# Patient Record
Sex: MALE | Race: WHITE | NOT HISPANIC OR LATINO | Employment: FULL TIME | URBAN - METROPOLITAN AREA
[De-identification: names, ages, dates, MRNs, and addresses within clinical notes are randomized per-mention and may not be internally consistent; named-entity substitution may affect disease eponyms.]

---

## 2019-04-27 ENCOUNTER — TRANSCRIBE ORDERS (OUTPATIENT)
Dept: ADMINISTRATIVE | Facility: HOSPITAL | Age: 29
End: 2019-04-27

## 2019-04-27 DIAGNOSIS — N50.819 TESTICULAR PAIN: Primary | ICD-10-CM

## 2019-05-06 ENCOUNTER — HOSPITAL ENCOUNTER (OUTPATIENT)
Dept: ULTRASOUND IMAGING | Facility: HOSPITAL | Age: 29
Discharge: HOME | End: 2019-05-06
Payer: COMMERCIAL

## 2019-05-06 DIAGNOSIS — N50.819 TESTICULAR PAIN: ICD-10-CM

## 2019-05-06 PROCEDURE — 76870 US EXAM SCROTUM: CPT

## 2022-02-16 ENCOUNTER — HOSPITAL ENCOUNTER (EMERGENCY)
Facility: HOSPITAL | Age: 32
Discharge: HOME | End: 2022-02-16
Attending: EMERGENCY MEDICINE
Payer: COMMERCIAL

## 2022-02-16 VITALS
WEIGHT: 173.72 LBS | OXYGEN SATURATION: 99 % | DIASTOLIC BLOOD PRESSURE: 85 MMHG | SYSTOLIC BLOOD PRESSURE: 136 MMHG | RESPIRATION RATE: 16 BRPM | HEART RATE: 83 BPM | TEMPERATURE: 98.6 F

## 2022-02-16 DIAGNOSIS — Z77.29 CARBON MONOXIDE EXPOSURE: Primary | ICD-10-CM

## 2022-02-16 LAB — GAS + CO PNL BLDA: 2.2 % (ref 0–1.5)

## 2022-02-16 PROCEDURE — 99282 EMERGENCY DEPT VISIT SF MDM: CPT | Performed by: EMERGENCY MEDICINE

## 2022-02-16 PROCEDURE — 36415 COLL VENOUS BLD VENIPUNCTURE: CPT

## 2022-02-16 PROCEDURE — 82375 ASSAY CARBOXYHB QUANT: CPT

## 2022-02-16 PROCEDURE — 99283 EMERGENCY DEPT VISIT LOW MDM: CPT

## 2022-02-16 NOTE — ED ATTENDING ATTESTATION
2/16/2022  IBree MD, saw and evaluated the patient  I have discussed the patient with the resident/non-physician practitioner and agree with the resident's/non-physician practitioner's findings, Plan of Care, and MDM as documented in the resident's/non-physician practitioner's note, except where noted  All available labs and Radiology studies were reviewed  I was present for key portions of any procedure(s) performed by the resident/non-physician practitioner and I was immediately available to provide assistance  At this point I agree with the current assessment done in the Emergency Department  I have conducted an independent evaluation of this patient a history and physical is as follows:    ED Course         Critical Care Time  Procedures    Patient presents after having his boiler serviced, concern for carbon monoxide exposure  Low 60s PPM per their home CO detector  Asymptomatic  No f/c/s  No vomiting or diarrhea  MDM will check CO levels
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
## 2022-02-16 NOTE — DISCHARGE INSTRUCTIONS
Return to the emergency department immediately if you develop nausea, vomiting, severe headache, confusion, lethargy, loss of consciousness

## 2025-03-11 ENCOUNTER — HOSPITAL ENCOUNTER (EMERGENCY)
Facility: HOSPITAL | Age: 35
Discharge: HOME | End: 2025-03-11
Attending: EMERGENCY MEDICINE
Payer: COMMERCIAL

## 2025-03-11 VITALS
OXYGEN SATURATION: 99 % | DIASTOLIC BLOOD PRESSURE: 98 MMHG | SYSTOLIC BLOOD PRESSURE: 144 MMHG | TEMPERATURE: 97.8 F | HEART RATE: 84 BPM | RESPIRATION RATE: 18 BRPM

## 2025-03-11 DIAGNOSIS — M54.9 BACK PAIN: ICD-10-CM

## 2025-03-11 DIAGNOSIS — S29.012A STRAIN OF MID-BACK, INITIAL ENCOUNTER: ICD-10-CM

## 2025-03-11 DIAGNOSIS — M54.9 MID BACK PAIN ON LEFT SIDE: Primary | ICD-10-CM

## 2025-03-11 PROCEDURE — 99284 EMERGENCY DEPT VISIT MOD MDM: CPT | Performed by: EMERGENCY MEDICINE

## 2025-03-11 PROCEDURE — 96372 THER/PROPH/DIAG INJ SC/IM: CPT

## 2025-03-11 PROCEDURE — 99282 EMERGENCY DEPT VISIT SF MDM: CPT

## 2025-03-11 RX ORDER — KETOROLAC TROMETHAMINE 30 MG/ML
15 INJECTION, SOLUTION INTRAMUSCULAR; INTRAVENOUS ONCE
Status: COMPLETED | OUTPATIENT
Start: 2025-03-11 | End: 2025-03-11

## 2025-03-11 RX ORDER — LIDOCAINE 50 MG/G
1 PATCH TOPICAL ONCE
Status: DISCONTINUED | OUTPATIENT
Start: 2025-03-11 | End: 2025-03-11 | Stop reason: HOSPADM

## 2025-03-11 RX ORDER — ACETAMINOPHEN 325 MG/1
975 TABLET ORAL ONCE
Status: COMPLETED | OUTPATIENT
Start: 2025-03-11 | End: 2025-03-11

## 2025-03-11 RX ORDER — NAPROXEN 500 MG/1
500 TABLET ORAL 2 TIMES DAILY WITH MEALS
Qty: 30 TABLET | Refills: 0 | Status: SHIPPED | OUTPATIENT
Start: 2025-03-11

## 2025-03-11 RX ORDER — METHOCARBAMOL 500 MG/1
500 TABLET, FILM COATED ORAL 2 TIMES DAILY
Qty: 20 TABLET | Refills: 0 | Status: SHIPPED | OUTPATIENT
Start: 2025-03-11

## 2025-03-11 RX ORDER — METHOCARBAMOL 500 MG/1
1000 TABLET, FILM COATED ORAL ONCE
Status: COMPLETED | OUTPATIENT
Start: 2025-03-11 | End: 2025-03-11

## 2025-03-11 RX ADMIN — METHOCARBAMOL 1000 MG: 500 TABLET ORAL at 14:01

## 2025-03-11 RX ADMIN — ACETAMINOPHEN 975 MG: 325 TABLET, FILM COATED ORAL at 14:01

## 2025-03-11 RX ADMIN — KETOROLAC TROMETHAMINE 15 MG: 30 INJECTION, SOLUTION INTRAMUSCULAR; INTRAVENOUS at 14:03

## 2025-03-11 RX ADMIN — LIDOCAINE 1 PATCH: 700 PATCH TOPICAL at 14:01

## 2025-03-11 NOTE — ED ATTENDING ATTESTATION
3/11/2025  I, Diogenes Vera MD, saw and evaluated the patient. I have discussed the patient with the resident/non-physician practitioner and agree with the resident's/non-physician practitioner's findings, Plan of Care, and MDM as documented in the resident's/non-physician practitioner's note, except where noted. All available labs and Radiology studies were reviewed.  I was present for key portions of any procedure(s) performed by the resident/non-physician practitioner and I was immediately available to provide assistance.       At this point I agree with the current assessment done in the Emergency Department.  I have conducted an independent evaluation of this patient a history and physical is as follows:    S:  Chief Complaint   Patient presents with    Back Pain     Patient reports back spasms started Monday reports today has been getting worse through the day.      Chetan is a 34 y.o. male with a history of herniated discs who presents with the chief complaint of muscle spasms in his mid/lower right side of his back.  He reports that yesterday he was picking up sticks and thinks he overdid it.  He has been taking otc medications without significant improvement.  No saddle anesthesia, no loss of strength.    O:  ED Triage Vitals [03/11/25 1259]   Temperature Pulse Respirations Blood Pressure SpO2   97.8 °F (36.6 °C) 84 18 144/98 99 %      Temp Source Heart Rate Source Patient Position - Orthostatic VS BP Location FiO2 (%)   Oral Monitor -- Right arm --      Pain Score       4         Physical Exam  Vitals and nursing note reviewed.   Constitutional:       General: He is in acute distress.      Appearance: He is well-developed.   HENT:      Head: Normocephalic and atraumatic.   Eyes:      Pupils: Pupils are equal, round, and reactive to light.   Neck:      Vascular: No JVD.   Cardiovascular:      Rate and Rhythm: Normal rate and regular rhythm.      Heart sounds: Normal heart sounds. No murmur  heard.     No friction rub. No gallop.   Pulmonary:      Effort: Pulmonary effort is normal. No respiratory distress.      Breath sounds: Normal breath sounds. No wheezing or rales.   Chest:      Chest wall: No tenderness.   Musculoskeletal:         General: Tenderness (right sided muscular tenderness in back) present. Normal range of motion.      Cervical back: Normal range of motion.   Skin:     General: Skin is warm and dry.   Neurological:      General: No focal deficit present.      Mental Status: He is alert and oriented to person, place, and time.      Cranial Nerves: No cranial nerve deficit.      Motor: No weakness.   Psychiatric:         Behavior: Behavior normal.         Thought Content: Thought content normal.         Judgment: Judgment normal.         A/P:  34 y.o. male presents with chief complaint of right low back pain without radiation.  No concerning s/s for cauda equina or epidural abscess.  Will treat with analgesia and anti-inflammatory regimen and refer to spine if symptoms persist.       ED Course     Medications   methocarbamol (ROBAXIN) tablet 1,000 mg (1,000 mg Oral Given 3/11/25 1401)   acetaminophen (TYLENOL) tablet 975 mg (975 mg Oral Given 3/11/25 1401)   ketorolac (TORADOL) injection 15 mg (15 mg Intramuscular Given 3/11/25 1403)         Critical Care Time  Procedures    Time reflects when diagnosis was documented in both MDM as applicable and the Disposition within this note       Time User Action Codes Description Comment    3/11/2025  1:56 PM Enrique Mcelroy [M54.9] Mid back pain on left side     3/11/2025  2:24 PM Enrique Mcelroy Add [M54.9] Back pain     3/11/2025  2:47 PM Enrique Mcelroy [S29.012A] Strain of mid-back, initial encounter           ED Disposition       ED Disposition   Discharge    Condition   Stable    Date/Time   Tue Mar 11, 2025  2:22 PM    Comment   Chetan Dennis discharge to home/self care.                   Follow-up Information       Follow up With  Specialties Details Why Contact Info Additional Information    Uday Blackmon,  Family Medicine  Please call tomorrow to schedule an appointment [address] [address]  [phone]       [facility] Physical Therapy  Please call tomorrow to schedule an appointment [phone]

## 2025-03-11 NOTE — ED PROVIDER NOTES
"Time reflects when diagnosis was documented in both MDM as applicable and the Disposition within this note       Time User Action Codes Description Comment    3/11/2025  1:56 PM Enrique Mcelroy [M54.9] Mid back pain on left side     3/11/2025  2:24 PM Enrique Mcelroy Add [M54.9] Back pain     3/11/2025  2:47 PM Enrique Mcelroy [S29.012A] Strain of mid-back, initial encounter           ED Disposition       ED Disposition   Discharge    Condition   Stable    Date/Time   Tue Mar 11, 2025  2:22 PM    Comment   Chetan Dennis discharge to home/self care.                   Assessment & Plan       Medical Decision Making  Patient with history as below presented to triage with CC of \" Patient presents with:  Back Pain: Patient reports back spasms started Monday reports today has been getting worse through the day.    \"  Hx obtained from pt. Exam as below.    Differential diagnoses includes lumbago versus musculoskeletal spasm / strain. No back pain red flags on history or physical. Presentation not consistent with malignancy (lack of history of malignancy, lack of B symptoms), fracture (no trauma, no bony tenderness to palpation), cauda equina (no bowel or urinary incontinence/retention, no saddle anesthesia, no distal weakness), AAA, viscus perforation , pulmonary embolism, renal colic, pyelonephritis (afebrile, no CVAT, no urinary symptoms). Given the clinical picture, no indication for imaging at this time.    Plan: pain control, supportive care, reassess, outpatient referral to comprehensive spine    DDx including but not limited to: sciatica, herniated disc, arthritis, spinal stenosis, strain, sprain, fracture, cauda equina syndrome, epidural abscess, transverse myelitis, AAA.        I have independently ordered, reviewed and interpreted the following: labs and/or imaging studies and or EKG listed below.  Reviewed external records including notes, and prior labs/imaging results.    Disposition: Patient stable for " outpatient management. Discussed need for follow up with their primary doctor or specialist to review all results, including incidental findings as below. Patient discharged with explanation of ED workup and diagnosis, instructions on how to obtain outpatient follow up, care instructions at home, and strict return precautions if patient develops new or worsening symptoms. Patients questions answered and agreeable with discharge plan.     See ED Course for further MDM.      PLEASE NOTE:  This encounter was completed utilizing the [employer]- [employer]Fluency Direct Speech Voice Recognition Software. Grammatical errors, random word insertions, pronoun errors and incomplete sentences are occasional inherent consequences of the system due to software limitations, ambient noise and hardware issues.These may be missed by proof reading prior to affixing electronic signature. Any questions or concerns about the content, text or information contained within the body of this dictation should be directly addressed to the physician for clarification. Please do not hesitate to call me directly if you have any questions or concerns.     Risk  OTC drugs.  Prescription drug management.             Medications   lidocaine (LIDODERM) 5 % patch 1 patch (1 patch Topical Medication Applied 3/11/25 1401)   methocarbamol (ROBAXIN) tablet 1,000 mg (1,000 mg Oral Given 3/11/25 1401)   acetaminophen (TYLENOL) tablet 975 mg (975 mg Oral Given 3/11/25 1401)   ketorolac (TORADOL) injection 15 mg (15 mg Intramuscular Given 3/11/25 1403)       ED Risk Strat Scores                                                History of Present Illness       Chief Complaint   Patient presents with    Back Pain     Patient reports back spasms started Monday reports today has been getting worse through the day.        No past medical history on file.   No past surgical history on file.   No family history on file.   Social History     Tobacco Use    Smoking status: Light  Smoker     Types: Cigars    Smokeless tobacco: Never      E-Cigarette/Vaping      E-Cigarette/Vaping Substances      I have reviewed and agree with the history as documented.     34-year-old male with history of prior herniated lumbar disc presenting to the ED with complaints of acute right-sided mid thoracic back pain that started this morning.  Patient denies any specific injury or trauma, however does report bending up and down multiple times picking up sticks in his yard yesterday.  Reports that he has had constant muscle spasm and pain since this morning, worse with positional changes and coughing.  Denies associated fever, chills, saddle anesthesia, bowel/bladder incontinence, lower extremity/upper extremity weakness, history of IV drug use or history of malignancy.  Denies associated chest pain, shortness of breath, abdominal pain, nausea, vomiting, urinary symptoms, rash.        Review of Systems   Constitutional:  Negative for chills and fever.   HENT:  Negative for congestion and sore throat.    Eyes:  Negative for photophobia, pain, redness and visual disturbance.   Respiratory:  Negative for cough, chest tightness and shortness of breath.    Cardiovascular:  Negative for chest pain, palpitations and leg swelling.   Gastrointestinal:  Negative for abdominal pain, constipation, diarrhea, nausea and vomiting.   Genitourinary:  Negative for dysuria, flank pain, frequency, hematuria, testicular pain and urgency.   Musculoskeletal:  Positive for back pain. Negative for arthralgias, neck pain and neck stiffness.   Skin:  Negative for color change and rash.   Neurological:  Negative for dizziness, seizures, syncope, weakness, light-headedness, numbness and headaches.   All other systems reviewed and are negative.          Objective       ED Triage Vitals [03/11/25 1259]   Temperature Pulse Blood Pressure Respirations SpO2 Patient Position - Orthostatic VS   97.8 °F (36.6 °C) 84 144/98 18 99 % --      Temp Source  Heart Rate Source BP Location FiO2 (%) Pain Score    Oral Monitor Right arm -- 4      Vitals      Date and Time Temp Pulse SpO2 Resp BP Pain Score FACES Pain Rating User   03/11/25 1401 -- -- -- -- -- 3 -- AG   03/11/25 1259 97.8 °F (36.6 °C) 84 99 % 18 144/98 4 -- SB            Physical Exam  Vitals and nursing note reviewed.   Constitutional:       General: He is not in acute distress.     Appearance: He is well-developed. He is not toxic-appearing.   HENT:      Head: Normocephalic and atraumatic.      Right Ear: External ear normal.      Left Ear: External ear normal.      Nose: Nose normal. No congestion.      Mouth/Throat:      Mouth: Mucous membranes are moist.      Pharynx: Oropharynx is clear. No oropharyngeal exudate or posterior oropharyngeal erythema.   Eyes:      Extraocular Movements: Extraocular movements intact.      Conjunctiva/sclera: Conjunctivae normal.      Pupils: Pupils are equal, round, and reactive to light.   Cardiovascular:      Rate and Rhythm: Normal rate and regular rhythm.      Pulses: Normal pulses.      Heart sounds: Normal heart sounds. No murmur heard.     No friction rub. No gallop.   Pulmonary:      Effort: Pulmonary effort is normal. No respiratory distress.      Breath sounds: Normal breath sounds. No stridor. No wheezing, rhonchi or rales.   Abdominal:      General: Bowel sounds are normal.      Palpations: Abdomen is soft.      Tenderness: There is no abdominal tenderness. There is no right CVA tenderness, left CVA tenderness, guarding or rebound.   Musculoskeletal:         General: No swelling or deformity. Normal range of motion.      Cervical back: Normal, normal range of motion and neck supple. No rigidity or tenderness.      Thoracic back: Spasms and tenderness present. No bony tenderness.      Lumbar back: Normal. Negative right straight leg raise test and negative left straight leg raise test.        Back:       Right lower leg: No edema.      Left lower leg: No edema.    Lymphadenopathy:      Cervical: No cervical adenopathy.   Skin:     General: Skin is warm and dry.      Capillary Refill: Capillary refill takes less than 2 seconds.      Coloration: Skin is not jaundiced or pale.      Findings: No bruising, erythema, lesion or rash.   Neurological:      General: No focal deficit present.      Mental Status: He is alert and oriented to person, place, and time. Mental status is at baseline.      GCS: GCS eye subscore is 4. GCS verbal subscore is 5. GCS motor subscore is 6.      Cranial Nerves: Cranial nerves 2-12 are intact. No cranial nerve deficit, dysarthria or facial asymmetry.      Sensory: Sensation is intact. No sensory deficit.      Motor: Motor function is intact. No abnormal muscle tone or pronator drift.      Coordination: Coordination is intact.      Gait: Gait is intact.   Psychiatric:         Mood and Affect: Mood normal.         Behavior: Behavior normal.         Thought Content: Thought content normal.         Results Reviewed       None            No orders to display       Procedures    ED Medication and Procedure Management   None     Discharge Medication List as of 3/11/2025  2:25 PM        START taking these medications    Details   methocarbamol (ROBAXIN) 500 mg tablet Take 1 tablet (500 mg total) by mouth 2 (two) times a day, Starting Tue 3/11/2025, Normal      naproxen (Naprosyn) 500 mg tablet Take 1 tablet (500 mg total) by mouth 2 (two) times a day with meals, Starting Tue 3/11/2025, Normal             ED SEPSIS DOCUMENTATION   Time reflects when diagnosis was documented in both MDM as applicable and the Disposition within this note       Time User Action Codes Description Comment    3/11/2025  1:56 PM Enrique Mcelroy [M54.9] Mid back pain on left side     3/11/2025  2:24 PM Enrique Mcelroy [M54.9] Back pain     3/11/2025  2:47 PM Enrique Mcelroy [S29.012A] Strain of mid-back, initial encounter                  Enrique Mcelroy DO  03/11/25 0437

## 2025-03-11 NOTE — Clinical Note
Chetan Dennis was seen and treated in our emergency department on 3/11/2025.            No heavy lifting (>30lbs), light duty only.    Diagnosis:     Chetan  is off the rest of the shift today.    He may return on this date: 03/13/2025         If you have any questions or concerns, please don't hesitate to call.      Enrique Mcelroy, DO    ______________________________           _______________          _______________  Hospital Representative                              Date                                Time

## 2025-03-11 NOTE — DISCHARGE INSTRUCTIONS
Today Chetan Dennis was seen in the emergency department for back pain. I believe the symptoms to be the result of muscle spasm. At this time there does not appear to be an emergent life threatening cause to explain these symptoms. Chetan is stable for discharge with outpatient follow up.     Please follow up with your primary care provider in the week. A referral was placed for you to comprehensive spine please call them tomorrow to be seen at the next available appointment. Please review all results discussed today with your primary care provider and/or specialist.    Please return to the emergency department as soon as possible if you develop uncontrollable fevers (Temp >100.4F), uncontrollable pain, vomiting, chest pain, trouble breathing, weakness on one side of your body, slurred speech, vision changes or any other concerning symptoms.

## 2025-03-12 ENCOUNTER — TELEPHONE (OUTPATIENT)
Dept: PHYSICAL THERAPY | Facility: OTHER | Age: 35
End: 2025-03-12

## 2025-03-12 ENCOUNTER — NURSE TRIAGE (OUTPATIENT)
Dept: PHYSICAL THERAPY | Facility: OTHER | Age: 35
End: 2025-03-12

## 2025-03-12 DIAGNOSIS — M54.6 ACUTE BILATERAL THORACIC BACK PAIN: Primary | ICD-10-CM

## 2025-03-12 NOTE — TELEPHONE ENCOUNTER
Additional Information   Negative: Has the patient had unexplained weight loss?   Negative: Does the patient have a fever?   Negative: Is the patient experiencing urine retention?   Negative: Is the patient experiencing acute drop foot or paralysis?   Negative: Has the patient experienced major trauma? (fall from height, high speed collision, direct blow to spine) and is also experiencing nausea, light-headedness, or loss of consciousness?   Negative: Is the patient experiencing blood in sputum?   Negative: Is this a chronic condition?    Protocols used: [facility] Protocol    [facility] was reviewed in detail and what we can provide for their back pain.  Patient is agreeable to being triaged and would like to proceed with Physical Therapy.    Referral was placed for Physical Therapy at the [place] site. Patients information was sent to the  to make evaluation appointment. Patient made aware that the PT office  will be calling to schedule the appointment.  Patient was provided with the phone number to the PT office.    No further questions and/or concerns were voiced by the patient at this time. Patient states understanding of the referral that was placed.    Referral Closed.

## 2025-03-12 NOTE — TELEPHONE ENCOUNTER
Additional Information   Negative: Is this related to a work injury?   Negative: Is this related to an MVA?   Negative: Are you currently recieving homecare services?    Background - Initial Assessment  Clinical complaint: Pain is left mid back, during a spasm will be bilat mid back. No numbness or tingling. Started 3/10/25. Worsened on 3/11/25. NKI. Pt was doing yard work prior, but had no injury or immediate pain. Has a HX of low back pain. Was in PT for that in 2021. This pain is in a different area. Did have similar pain/spasms in the same mid back area approx 1 year ago. Was seen by either PCP or UC and given muscle relaxer's which cleared everything up in a day or so. He states his left mid back pain is steady and the bilat mid back pain is intermittent during spasms. No prior back SX. Pain is a constant dull ache. During a spasm is sharp, stabbing and squeezing. Spasms are present once every half hour. Seen in ED 3/11/25  Date of onset: 3/10/25  Frequency of pain: persistent  Quality of pain: aching, dull, sharp, and stabbing    Protocols used: [facility] Protocol

## 2025-03-12 NOTE — TELEPHONE ENCOUNTER
Call placed to the patient per [facility] referral.    Voice message left for patient to call back. Phone number and hours of business provided.       This is the 1st attempt to reach the patient.  Will defer per protocol.

## 2025-03-18 ENCOUNTER — EVALUATION (OUTPATIENT)
Dept: PHYSICAL THERAPY | Facility: REHABILITATION | Age: 35
End: 2025-03-18
Payer: COMMERCIAL

## 2025-03-18 DIAGNOSIS — M54.6 ACUTE BILATERAL THORACIC BACK PAIN: ICD-10-CM

## 2025-03-18 PROCEDURE — 97110 THERAPEUTIC EXERCISES: CPT | Performed by: PHYSICAL THERAPIST

## 2025-03-18 PROCEDURE — 97162 PT EVAL MOD COMPLEX 30 MIN: CPT | Performed by: PHYSICAL THERAPIST

## 2025-03-18 NOTE — LETTER
2025    Uday Blackmon DO  [address] [place] [address]    Patient: Chetan Dennis   YOB: 1990   Date of Visit: 3/18/2025     Encounter Diagnosis     ICD-10-CM    1. Acute bilateral thoracic back pain  M54.6 Ambulatory referral to PT spine          Dear Dr. Blackmon:    Thank you for your recent referral of Chetan Dennis. Please review the attached evaluation summary from Chetan's recent visit.     Please verify that you agree with the plan of care by signing the attached order.     If you have any questions or concerns, please do not hesitate to call.     I sincerely appreciate the opportunity to share in the care of one of your patients and hope to have another opportunity to work with you in the near future.       Sincerely,    Diogenes Lancaster, PT      Referring Provider:      I certify that I have read the below Plan of Care and certify the need for these services furnished under this plan of treatment while under my care.                    Uday Blackmon DO  [address] [place] [address]  Via Fax: [phone]          PT Evaluation     Today's date: 3/18/2025  Patient name: Chetan Dennis  : 1990  MRN: [id number]  Referring provider: Usama Walker PT  Dx:   Encounter Diagnosis     ICD-10-CM    1. Acute bilateral thoracic back pain  M54.6 Ambulatory referral to PT spine                     Assessment  Impairments: abnormal coordination, abnormal gait, abnormal muscle firing, abnormal or restricted ROM, abnormal movement, activity intolerance, impaired balance, impaired physical strength, lacks appropriate home exercise program, pain with function and weight-bearing intolerance    Assessment details: Patient presents to PT with thoracic pain. Patient displays decreased thoracic ROM, scapular strength. These impairments are leading to pain with coughing, breathing, UE use. Patient will benefit from skilled PT to address above impairments and help them  return to PLOF.    Barriers to therapy: Works in construction  Understanding of Dx/Px/POC: good     Prognosis: good    Goals  STG  1. Patient will display decreased pain to 0-2 in 6 weeks  2. Patient will display thoracic ROM WNL in 6 weeks  3. Patient will display scapular strength WNL in 6 weeks    LTG  1. Patient will be able to stand for 30 minutes without pain in 12 weeks  2. Patient will be able to walk for 30 minutes without pain in 12 weeks  3. Patient will be pick an object up off the floor without pain in 12 weeks          Plan  Planned modality interventions: traction, TENS, biofeedback, cryotherapy and thermotherapy: hydrocollator packs    Planned therapy interventions: abdominal trunk stabilization, activity modification, ADL training, balance, balance/weight bearing training, body mechanics training, joint mobilization, manual therapy, massage, motor coordination training, neuromuscular re-education, patient education, postural training, self care, strengthening, stretching, therapeutic activities, therapeutic exercise, transfer training, home exercise program, graded activity, graded exercise, functional ROM exercises and flexibility    Frequency: 2x week  Duration in weeks: 6  Plan of Care beginning date: 3/18/2025  Plan of Care expiration date: 2025        Subjective Evaluation    History of Present Illness  Mechanism of injury: Patient states about a week ago, he started getting muscle spasms in his mid back and couldn't move. He was even having trouble breathing. Patient is now doing better. He still feels tightness but the pain has mostly subsided. He is still being cautious. He is still having difficulty bending back. He denies symptoms into his UEs          Not a recurrent problem   Quality of life: good    Patient Goals  Patient goals for therapy: decreased edema, decreased pain, increased motion and increased strength    Pain  Current pain ratin  At best pain ratin  At worst pain  ratin  Quality: sharp and tight  Relieving factors: medications and heat  Aggravating factors: overhead activity and lifting  Progression: improved    Social Support  Steps to enter house: yes    Employment status: working        Objective     Active Range of Motion   Cervical/Thoracic Spine       Thoracic    Flexion:  WFL  Extension: 10 degrees     with pain Restriction level: moderate  Left lateral flexion: 20 degrees    with pain Restriction level: minimal  Right lateral flexion: 20 degrees  with pain Restriction level: minimal  Left rotation: 30 degrees  Restriction level: minimal  Right rotation: 30 degrees  with pain Restriction level: minimal    Strength/Myotome Testing     Left Shoulder     Isolated Muscles   Middle deltoid: 4   Middle trapezius: 4     Right Shoulder     Isolated Muscles   Middle deltoid: 4   Middle trapezius: 4              Precautions:       Manuals 3/18                                                                Neuro Re-Ed             rows Grn 3x10            Low row Grn 3x10            Pallof press                                                                 Ther Ex             ltr 2x10            Open book 2x10            T-spine ext             Foam roller             Prone t,i                                                    Ther Activity                                       Gait Training                                       Modalities

## 2025-03-18 NOTE — PROGRESS NOTES
PT Evaluation     Today's date: 3/18/2025  Patient name: Chetan Dennis  : 1990  MRN: [id number]  Referring provider: Usama Walker PT  Dx:   Encounter Diagnosis     ICD-10-CM    1. Acute bilateral thoracic back pain  M54.6 Ambulatory referral to PT spine                     Assessment  Impairments: abnormal coordination, abnormal gait, abnormal muscle firing, abnormal or restricted ROM, abnormal movement, activity intolerance, impaired balance, impaired physical strength, lacks appropriate home exercise program, pain with function and weight-bearing intolerance    Assessment details: Patient presents to PT with thoracic pain. Patient displays decreased thoracic ROM, scapular strength. These impairments are leading to pain with coughing, breathing, UE use. Patient will benefit from skilled PT to address above impairments and help them return to PLOF.    Barriers to therapy: Works in construction  Understanding of Dx/Px/POC: good     Prognosis: good    Goals  STG  1. Patient will display decreased pain to 0-2 in 6 weeks  2. Patient will display thoracic ROM WNL in 6 weeks  3. Patient will display scapular strength WNL in 6 weeks    LTG  1. Patient will be able to stand for 30 minutes without pain in 12 weeks  2. Patient will be able to walk for 30 minutes without pain in 12 weeks  3. Patient will be pick an object up off the floor without pain in 12 weeks          Plan  Planned modality interventions: traction, TENS, biofeedback, cryotherapy and thermotherapy: hydrocollator packs    Planned therapy interventions: abdominal trunk stabilization, activity modification, ADL training, balance, balance/weight bearing training, body mechanics training, joint mobilization, manual therapy, massage, motor coordination training, neuromuscular re-education, patient education, postural training, self care, strengthening, stretching, therapeutic activities, therapeutic exercise, transfer training, home exercise  program, graded activity, graded exercise, functional ROM exercises and flexibility    Frequency: 2x week  Duration in weeks: 6  Plan of Care beginning date: 3/18/2025  Plan of Care expiration date: 2025        Subjective Evaluation    History of Present Illness  Mechanism of injury: Patient states about a week ago, he started getting muscle spasms in his mid back and couldn't move. He was even having trouble breathing. Patient is now doing better. He still feels tightness but the pain has mostly subsided. He is still being cautious. He is still having difficulty bending back. He denies symptoms into his UEs          Not a recurrent problem   Quality of life: good    Patient Goals  Patient goals for therapy: decreased edema, decreased pain, increased motion and increased strength    Pain  Current pain ratin  At best pain ratin  At worst pain ratin  Quality: sharp and tight  Relieving factors: medications and heat  Aggravating factors: overhead activity and lifting  Progression: improved    Social Support  Steps to enter house: yes    Employment status: working        Objective     Active Range of Motion   Cervical/Thoracic Spine       Thoracic    Flexion:  WFL  Extension: 10 degrees     with pain Restriction level: moderate  Left lateral flexion: 20 degrees    with pain Restriction level: minimal  Right lateral flexion: 20 degrees  with pain Restriction level: minimal  Left rotation: 30 degrees  Restriction level: minimal  Right rotation: 30 degrees  with pain Restriction level: minimal    Strength/Myotome Testing     Left Shoulder     Isolated Muscles   Middle deltoid: 4   Middle trapezius: 4     Right Shoulder     Isolated Muscles   Middle deltoid: 4   Middle trapezius: 4              Precautions:       Manuals 3/18                                                                Neuro Re-Ed             rows Grn 3x10            Low row Grn 3x10            Pallof press                                                                  Ther Ex             ltr 2x10            Open book 2x10            T-spine ext             Foam roller             Prone t,i                                                    Ther Activity                                       Gait Training                                       Modalities

## 2025-03-31 ENCOUNTER — OFFICE VISIT (OUTPATIENT)
Dept: PHYSICAL THERAPY | Facility: REHABILITATION | Age: 35
End: 2025-03-31
Payer: COMMERCIAL

## 2025-03-31 DIAGNOSIS — M54.6 ACUTE BILATERAL THORACIC BACK PAIN: Primary | ICD-10-CM

## 2025-03-31 PROCEDURE — 97112 NEUROMUSCULAR REEDUCATION: CPT | Performed by: PHYSICAL THERAPIST

## 2025-03-31 PROCEDURE — 97110 THERAPEUTIC EXERCISES: CPT | Performed by: PHYSICAL THERAPIST

## 2025-03-31 NOTE — PROGRESS NOTES
Daily Note     Today's date: 3/31/2025  Patient name: Chetan Dennis  : 1990  MRN: [id number]  Referring provider: Usama Walker PT  Dx:   Encounter Diagnosis     ICD-10-CM    1. Acute bilateral thoracic back pain  M54.6                      Subjective: patient states he feels 100%      Objective: See treatment diary below      Assessment: Tolerated treatment well. Patient demonstrated fatigue post treatment, exhibited good technique with therapeutic exercises, and would benefit from continued PT Patient with no limitations. Give updated HEP to limit recurrence      Plan: Continue per plan of care.      Precautions:       Manuals 3/18 3/31                                                               Neuro Re-Ed             rows Grn 3x10            Low row Grn 3x10            Pallof press             B ER  Grn 3x10           Pull aparts  Grn 3x10                                     Ther Ex             ltr 2x10 2x10           Open book 2x10 2x10           T-spine ext             Foam roller  2 mins           Prone t,i  5# 3x10                                                  Ther Activity                                       Gait Training                                       Modalities

## 2025-06-16 ENCOUNTER — OFFICE VISIT (OUTPATIENT)
Dept: PHYSICAL THERAPY | Facility: REHABILITATION | Age: 35
End: 2025-06-16
Attending: PHYSICAL THERAPIST
Payer: COMMERCIAL

## 2025-06-16 DIAGNOSIS — G89.29 CHRONIC RIGHT SHOULDER PAIN: Primary | ICD-10-CM

## 2025-06-16 DIAGNOSIS — M25.511 CHRONIC RIGHT SHOULDER PAIN: Primary | ICD-10-CM

## 2025-06-16 PROCEDURE — 97162 PT EVAL MOD COMPLEX 30 MIN: CPT | Performed by: PHYSICAL THERAPIST

## 2025-06-16 NOTE — PROGRESS NOTES
PT Evaluation     Today's date: 2025  Patient name: Chetan Dennis  : 1990  MRN: [id number]  Referring provider: Usama Walker PT  Dx:   Encounter Diagnosis     ICD-10-CM    1. Chronic right shoulder pain  M25.511     G89.29                      Assessment  Impairments: abnormal coordination, abnormal muscle firing, abnormal or restricted ROM, activity intolerance, impaired balance, impaired physical strength, lacks appropriate home exercise program, pain with function and scapular dyskinesis    Assessment details: Patient presents to PT with R shoulder pain. Patient displays decreased shoulder ROM and strength. These impairments are leading to pain with lifting and reaching. Patient will benefit from skilled PT to address above impairments and help them return to PLOF.  Barriers to therapy: Prior injury  Understanding of Dx/Px/POC: good     Prognosis: good    Goals  STG  1. Patient will display decreased pain to 0-2 in 6 weeks  2. Patient will display shoulder ROM WNL in 6 weeks  3. Patient will display shoulder strength WNL in 6 weeks    LTG  1. Patient will be able to lift arm overhead without pain in 12 weeks  2. Patient will be able to reach behind back without pain in 12 weeks  3. Patient will be able to sleep through the night without pain in 12 weeks      Plan  Patient would benefit from: PT eval  Referral necessary: Yes  Planned modality interventions: TENS, thermotherapy: hydrocollator packs, traction and cryotherapy    Planned therapy interventions: activity modification, ADL retraining, ADL training, behavior modification, functional ROM exercises, flexibility, graded activity, graded exercise, home exercise program, therapeutic training, therapeutic exercise, therapeutic activities, stretching, strengthening, postural training, patient education, neuromuscular re-education, manual therapy, joint mobilization and IADL retraining    Frequency: 2x week  Duration in weeks: 6  Plan of  Care beginning date: 2025  Plan of Care expiration date: 2025        Subjective Evaluation    History of Present Illness  Mechanism of injury: Patient states he dislocated his shoulder back in high school. He didn't do much back then and it got better. Now he has been trying to work out more and the pain is coming back. He had an instant when he got a sharp pain in the gym and almost dropped the weight. Patient had pain with all activities for the next 2 hours and then it subsided. Patient denies symptoms down his arm          Not a recurrent problem   Quality of life: good    Patient Goals  Patient goals for therapy: decreased edema, decreased pain, increased motion and increased strength    Pain  Current pain ratin  At best pain ratin  At worst pain ratin  Quality: sharp  Aggravating factors: overhead activity and lifting    Social Support  Steps to enter house: yes    Employment status: working        Objective     Active Range of Motion     Right Shoulder   Flexion: 180 degrees   Abduction: 180 degrees   External rotation BTH: T2   Internal rotation BTB: L3 with pain    Passive Range of Motion     Right Shoulder   Flexion: WFL  Abduction: WFL  External rotation 0°: WFL  Internal rotation 0°: 50 degrees with pain    Strength/Myotome Testing     Right Shoulder     Planes of Motion   Flexion: 4+   Abduction: 4+   External rotation at 0°: 4   Internal rotation at 0°: 4+     Tests     Right Shoulder   Positive empty can.   Negative active compression ([place]) and apprehension.              Precautions: dislocation in high school      Manuals                                                                 Neuro Re-Ed             Shoulder IR 3x10            Shoulder ER 3x10            90/90 3x10                                                                Ther Ex                                                                                                                     Ther Activity                                        Gait Training                                       Modalities

## 2025-06-16 NOTE — LETTER
2025    Uday Blackmon DO  [address] [place] [address]    Patient: Chetan Dennis   YOB: 1990   Date of Visit: 2025     Encounter Diagnosis     ICD-10-CM    1. Chronic right shoulder pain  M25.511     G89.29           Dear Dr. Uday Blackmon, DO:    Thank you for your recent referral of Chetan Dennis. Please review the attached evaluation summary from Chetan's recent visit.     Please verify that you agree with the plan of care by signing the attached order.     If you have any questions or concerns, please do not hesitate to call.     I sincerely appreciate the opportunity to share in the care of one of your patients and hope to have another opportunity to work with you in the near future.       Sincerely,    Diogenes Lancaster, PT      Referring Provider:      I certify that I have read the below Plan of Care and certify the need for these services furnished under this plan of treatment while under my care.                    Uday Blackmon DO  [address] [place] [address]  Via Fax: [phone]          PT Evaluation     Today's date: 2025  Patient name: Chetan Dennis  : 1990  MRN: [id number]  Referring provider: Usama Walker PT  Dx:   Encounter Diagnosis     ICD-10-CM    1. Chronic right shoulder pain  M25.511     G89.29                      Assessment  Impairments: abnormal coordination, abnormal muscle firing, abnormal or restricted ROM, activity intolerance, impaired balance, impaired physical strength, lacks appropriate home exercise program, pain with function and scapular dyskinesis    Assessment details: Patient presents to PT with R shoulder pain. Patient displays decreased shoulder ROM and strength. These impairments are leading to pain with lifting and reaching. Patient will benefit from skilled PT to address above impairments and help them return to PLOF.  Barriers to therapy: Prior injury  Understanding of Dx/Px/POC: good      Prognosis: good    Goals  STG  1. Patient will display decreased pain to 0-2 in 6 weeks  2. Patient will display shoulder ROM WNL in 6 weeks  3. Patient will display shoulder strength WNL in 6 weeks    LTG  1. Patient will be able to lift arm overhead without pain in 12 weeks  2. Patient will be able to reach behind back without pain in 12 weeks  3. Patient will be able to sleep through the night without pain in 12 weeks      Plan  Patient would benefit from: PT eval  Referral necessary: Yes  Planned modality interventions: TENS, thermotherapy: hydrocollator packs, traction and cryotherapy    Planned therapy interventions: activity modification, ADL retraining, ADL training, behavior modification, functional ROM exercises, flexibility, graded activity, graded exercise, home exercise program, therapeutic training, therapeutic exercise, therapeutic activities, stretching, strengthening, postural training, patient education, neuromuscular re-education, manual therapy, joint mobilization and IADL retraining    Frequency: 2x week  Duration in weeks: 6  Plan of Care beginning date: 2025  Plan of Care expiration date: 2025        Subjective Evaluation    History of Present Illness  Mechanism of injury: Patient states he dislocated his shoulder back in high school. He didn't do much back then and it got better. Now he has been trying to work out more and the pain is coming back. He had an instant when he got a sharp pain in the gym and almost dropped the weight. Patient had pain with all activities for the next 2 hours and then it subsided. Patient denies symptoms down his arm          Not a recurrent problem   Quality of life: good    Patient Goals  Patient goals for therapy: decreased edema, decreased pain, increased motion and increased strength    Pain  Current pain ratin  At best pain ratin  At worst pain ratin  Quality: sharp  Aggravating factors: overhead activity and lifting    Social  Support  Steps to enter house: yes    Employment status: working        Objective     Active Range of Motion     Right Shoulder   Flexion: 180 degrees   Abduction: 180 degrees   External rotation BTH: T2   Internal rotation BTB: L3 with pain    Passive Range of Motion     Right Shoulder   Flexion: WFL  Abduction: WFL  External rotation 0°: WFL  Internal rotation 0°: 50 degrees with pain    Strength/Myotome Testing     Right Shoulder     Planes of Motion   Flexion: 4+   Abduction: 4+   External rotation at 0°: 4   Internal rotation at 0°: 4+     Tests     Right Shoulder   Positive empty can.   Negative active compression ([place]) and apprehension.              Precautions: dislocation in high school      Manuals 6/16                                                                Neuro Re-Ed             Shoulder IR 3x10            Shoulder ER 3x10            90/90 3x10                                                                Ther Ex                                                                                                                     Ther Activity                                       Gait Training                                       Modalities